# Patient Record
Sex: FEMALE | Race: WHITE | NOT HISPANIC OR LATINO | Employment: UNEMPLOYED | URBAN - NONMETROPOLITAN AREA
[De-identification: names, ages, dates, MRNs, and addresses within clinical notes are randomized per-mention and may not be internally consistent; named-entity substitution may affect disease eponyms.]

---

## 2019-02-19 ENCOUNTER — OFFICE VISIT (OUTPATIENT)
Dept: UROLOGY | Facility: CLINIC | Age: 54
End: 2019-02-19

## 2019-02-19 VITALS — BODY MASS INDEX: 41.91 KG/M2 | HEIGHT: 61 IN | WEIGHT: 222 LBS

## 2019-02-19 DIAGNOSIS — R32 URINARY INCONTINENCE, UNSPECIFIED TYPE: ICD-10-CM

## 2019-02-19 DIAGNOSIS — N39.41 URGENCY INCONTINENCE: ICD-10-CM

## 2019-02-19 DIAGNOSIS — N39.3 STRESS INCONTINENCE: Primary | ICD-10-CM

## 2019-02-19 LAB
BILIRUB BLD-MCNC: NEGATIVE MG/DL
CLARITY, POC: CLEAR
COLOR UR: YELLOW
GLUCOSE UR STRIP-MCNC: NEGATIVE MG/DL
KETONES UR QL: NEGATIVE
LEUKOCYTE EST, POC: NEGATIVE
NITRITE UR-MCNC: POSITIVE MG/ML
PH UR: 6 [PH] (ref 5–8)
PROT UR STRIP-MCNC: ABNORMAL MG/DL
RBC # UR STRIP: ABNORMAL /UL
SP GR UR: 1.02 (ref 1–1.03)
UROBILINOGEN UR QL: NORMAL

## 2019-02-19 PROCEDURE — 81003 URINALYSIS AUTO W/O SCOPE: CPT | Performed by: UROLOGY

## 2019-02-19 PROCEDURE — 99203 OFFICE O/P NEW LOW 30 MIN: CPT | Performed by: UROLOGY

## 2019-02-19 NOTE — PROGRESS NOTES
Chief Complaint:          Progressive incontinence    HPI:   53 y.o. female.  Pt has stress incontinence at times and urgency incontinence at times.  She does not have any children.  This problem started 2 years ago and has progressively worse.  She wears 2 pads a day.  HPI      Past Medical History:        Past Medical History:   Diagnosis Date   • Arthritis    • Cirrhosis of liver (CMS/HCC)    • Heart disease    • History of transfusion          Current Meds:     Current Outpatient Medications   Medication Sig Dispense Refill   • amLODIPine (NORVASC) 2.5 MG tablet Take  by mouth daily.     • citalopram (CELEXA) 20 MG tablet Take  by mouth daily.     • esomeprazole (NEXIUM) 40 MG capsule Take  by mouth.     • gabapentin (NEURONTIN) 800 MG tablet Take  by mouth 3 (three) times a day.     • rosuvastatin (CRESTOR) 10 MG tablet Take  by mouth daily.       No current facility-administered medications for this visit.         Allergies:      Allergies   Allergen Reactions   • Toprol Xl [Metoprolol]         Past Surgical History:     Past Surgical History:   Procedure Laterality Date   • KNEE SURGERY     • LAPAROSCOPIC CHOLECYSTECTOMY           Social History:     Social History     Socioeconomic History   • Marital status: Single     Spouse name: Not on file   • Number of children: Not on file   • Years of education: Not on file   • Highest education level: Not on file   Social Needs   • Financial resource strain: Not on file   • Food insecurity - worry: Not on file   • Food insecurity - inability: Not on file   • Transportation needs - medical: Not on file   • Transportation needs - non-medical: Not on file   Occupational History   • Not on file   Tobacco Use   • Smoking status: Current Every Day Smoker   • Smokeless tobacco: Never Used   Substance and Sexual Activity   • Alcohol use: No   • Drug use: No   • Sexual activity: Not on file   Other Topics Concern   • Not on file   Social History Narrative   • Not on file        Family History:     Family History   Problem Relation Age of Onset   • Heart disease Father    • Heart disease Sister    • Cirrhosis Mother        Review of Systems:     Review of Systems   Constitutional: Negative for chills, fatigue and fever.   HENT: Negative for sinus pressure and sinus pain.    Respiratory: Negative for cough.    Cardiovascular: Negative for chest pain and palpitations.   Gastrointestinal: Negative for abdominal pain, constipation and diarrhea.   Genitourinary: Positive for frequency and urgency. Negative for dysuria.   Musculoskeletal: Negative for back pain.   Neurological: Negative for headaches.   Psychiatric/Behavioral: The patient is not nervous/anxious.      0    Over the past month…    1)  Incomplete Emptying  How often have you had a sensation of not emptying your bladder?  1 - Less than 1 time in 5   2)  Frequency  How often have you had to urinate less than every two hours? 3 - About half the time   3)  Intermittency  How often have you found you stopped and started again several times when you urinated?  0 - Not at all   4) Urgency  How often have you found it difficult to postpone urination?  3 - About half the time   5) Weak Stream  How often have you had a weak urinary stream?  0 - Not at all   6) Straining  How often have you had to push or strain to begin urination?  0 - Not at all   7) Nocturia  How many times did you typically get up at night to urinate?  5 - 5+ times   Total Score:  12       Quality of life due to urinary symptoms:  If you were to spend the rest of your life with your urinary condition the way it is now, how would you feel about that? 4-Mostly Dissatisfied   Urine Leakage (Incontinence) 2-Mild (More than a few drops a day, 1-2 pads/day)       I have reviewed the follow portions of the patient's history and confirmed they are accurate today:  allergies, current medications, past family history, past medical history, past social history, past surgical  "history, problem list and ROS  Physical Exam:     Physical Exam   Constitutional: She is oriented to person, place, and time. She appears well-developed.   Morbidly obese   HENT:   Head: Normocephalic.   Right Ear: External ear normal.   Left Ear: External ear normal.   Nose: Nose normal.   Mouth/Throat: Oropharynx is clear and moist.   Eyes: Conjunctivae and EOM are normal. Pupils are equal, round, and reactive to light.   Neck: Normal range of motion. Neck supple. No tracheal deviation present. No thyromegaly present.   Cardiovascular: Normal heart sounds. Exam reveals no gallop and no friction rub.   No murmur heard.  Pulmonary/Chest: Effort normal and breath sounds normal. No respiratory distress. She has no wheezes. She has no rales. She exhibits no tenderness.   Abdominal: Soft. Bowel sounds are normal. She exhibits no distension and no mass. There is no tenderness. There is no rebound and no guarding. No hernia.   Genitourinary: Vagina normal and uterus normal.   Genitourinary Comments: The patient has hypermobility of her urethrovesical angle with stress incontinence when she does a Valsalva maneuver   Musculoskeletal: Normal range of motion. She exhibits no edema.   Lymphadenopathy:     She has no cervical adenopathy.   Neurological: She is alert and oriented to person, place, and time. She displays normal reflexes. No cranial nerve deficit. She exhibits normal muscle tone. Coordination normal.   Skin: Skin is warm. No rash noted.   Psychiatric: She has a normal mood and affect. Judgment normal.       Ht 154.9 cm (61\")   Wt 101 kg (222 lb)   BMI 41.95 kg/m²    Procedure:         Assessment:     Encounter Diagnoses   Name Primary?   • Urinary incontinence, unspecified type    • Urgency incontinence    • Stress incontinence Yes     Orders Placed This Encounter   Procedures   • POC Urinalysis Dipstick, Automated       Plan:   The patient definitely has some stress incontinence as demonstrated on her pelvic " exam.  He also has urgency incontinence by history.  We'll start treating her urgency incontinence with Myrebetriq to see if she responds and how much of a residual problem is from her stress incontinence.  The patient has not had any children but she does have a problem with morbid obesity.  To the patient back in a month.    Patient's Body mass index is 41.95 kg/m². BMI is above normal parameters. Recommendations include: educational material.      Counseling was given to patient for the following topics impressions as follows: stress incontinence and urgency incontinence. The interim medical history and current results were reviewed.  A treatment plan with follow-up was made for Stress incontinence [N39.3].  This document has been electronically signed by Bull Smith MD February 19, 2019 2:20 PM

## 2019-03-19 ENCOUNTER — OFFICE VISIT (OUTPATIENT)
Dept: UROLOGY | Facility: CLINIC | Age: 54
End: 2019-03-19

## 2019-03-19 VITALS — HEIGHT: 61 IN | WEIGHT: 222 LBS | BODY MASS INDEX: 41.91 KG/M2

## 2019-03-19 DIAGNOSIS — N39.0 URINARY TRACT INFECTION WITH HEMATURIA, SITE UNSPECIFIED: ICD-10-CM

## 2019-03-19 DIAGNOSIS — R35.0 FREQUENCY OF MICTURITION: Primary | ICD-10-CM

## 2019-03-19 DIAGNOSIS — R31.9 URINARY TRACT INFECTION WITH HEMATURIA, SITE UNSPECIFIED: ICD-10-CM

## 2019-03-19 LAB
BILIRUB BLD-MCNC: ABNORMAL MG/DL
CLARITY, POC: ABNORMAL
COLOR UR: YELLOW
GLUCOSE UR STRIP-MCNC: NEGATIVE MG/DL
KETONES UR QL: NEGATIVE
LEUKOCYTE EST, POC: ABNORMAL
NITRITE UR-MCNC: POSITIVE MG/ML
PH UR: 6 [PH] (ref 5–8)
PROT UR STRIP-MCNC: NEGATIVE MG/DL
RBC # UR STRIP: NEGATIVE /UL
SP GR UR: 1.02 (ref 1–1.03)
UROBILINOGEN UR QL: NORMAL

## 2019-03-19 PROCEDURE — 81003 URINALYSIS AUTO W/O SCOPE: CPT | Performed by: UROLOGY

## 2019-03-19 PROCEDURE — 87186 SC STD MICRODIL/AGAR DIL: CPT | Performed by: UROLOGY

## 2019-03-19 PROCEDURE — 87088 URINE BACTERIA CULTURE: CPT | Performed by: UROLOGY

## 2019-03-19 PROCEDURE — 87086 URINE CULTURE/COLONY COUNT: CPT | Performed by: UROLOGY

## 2019-03-19 PROCEDURE — 99214 OFFICE O/P EST MOD 30 MIN: CPT | Performed by: UROLOGY

## 2019-03-19 NOTE — PROGRESS NOTES
Chief Complaint:        stress and urgency incontinence    HPI:   53 y.o. female.  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  Her symptms have 80% improved with myrbetriq.  HPI      Past Medical History:        Past Medical History:   Diagnosis Date   • Arthritis    • Cirrhosis of liver (CMS/HCC)    • Heart disease    • History of transfusion          Current Meds:     Current Outpatient Medications   Medication Sig Dispense Refill   • amLODIPine (NORVASC) 2.5 MG tablet Take  by mouth daily.     • citalopram (CELEXA) 20 MG tablet Take  by mouth daily.     • esomeprazole (NEXIUM) 40 MG capsule Take  by mouth.     • gabapentin (NEURONTIN) 800 MG tablet Take  by mouth 3 (three) times a day.     • Mirabegron ER (MYRBETRIQ) 50 MG tablet sustained-release 24 hour 24 hr tablet Take 50 mg by mouth Daily. 30 tablet 11   • rosuvastatin (CRESTOR) 10 MG tablet Take  by mouth daily.       No current facility-administered medications for this visit.         Allergies:      Allergies   Allergen Reactions   • Toprol Xl [Metoprolol]         Past Surgical History:     Past Surgical History:   Procedure Laterality Date   • KNEE SURGERY     • LAPAROSCOPIC CHOLECYSTECTOMY           Social History:     Social History     Socioeconomic History   • Marital status: Single     Spouse name: Not on file   • Number of children: Not on file   • Years of education: Not on file   • Highest education level: Not on file   Social Needs   • Financial resource strain: Not on file   • Food insecurity - worry: Not on file   • Food insecurity - inability: Not on file   • Transportation needs - medical: Not on file   • Transportation needs - non-medical: Not on file   Occupational History   • Not on file   Tobacco Use   • Smoking status: Current Every Day Smoker   • Smokeless tobacco: Never Used   Substance and Sexual Activity   • Alcohol use: No   • Drug use: No   • Sexual activity: Not on file   Other  "Topics Concern   • Not on file   Social History Narrative   • Not on file       Family History:     Family History   Problem Relation Age of Onset   • Heart disease Father    • Heart disease Sister    • Cirrhosis Mother        Review of Systems:     Review of Systems   Constitutional: Negative for fatigue.   HENT: Negative for sinus pressure and sinus pain.    Eyes: Negative for pain.   Respiratory: Negative for chest tightness.    Cardiovascular: Negative for chest pain.   Endocrine: Negative for heat intolerance.   Genitourinary: Negative for flank pain.   Musculoskeletal: Negative for back pain.   Allergic/Immunologic: Negative for food allergies.   Neurological: Negative for headaches.   Hematological: Does not bruise/bleed easily.   Psychiatric/Behavioral: The patient is not nervous/anxious.              I have reviewed the follow portions of the patient's history and confirmed they are accurate today:  allergies, current medications, past family history, past medical history, past social history, past surgical history, problem list and ROS  Physical Exam:     Physical Exam    Ht 154.9 cm (61\")   Wt 101 kg (222 lb)   BMI 41.95 kg/m²    Procedure:         Assessment:     Encounter Diagnoses   Name Primary?   • Frequency of micturition Yes   • Urinary tract infection with hematuria, site unspecified      Orders Placed This Encounter   Procedures   • Urine Culture - Urine, Urine, Random Void   • POC Urinalysis Dipstick, Automated       Plan:   Will continue myrbetriq and will have her try to loose weight to help with the stress incontinence    Patient's Body mass index is 41.95 kg/m². BMI is above normal parameters. Recommendations include: educational material.      Counseling was given to patient for the following topics instructions for management as follows: urge and stress incontinence. The interim medical history and current results were reviewed.  A treatment plan with follow-up was made for Frequency of " micturition [R35.0].  This document has been electronically signed by Bull Smith MD March 19, 2019 3:56 PM

## 2019-03-21 ENCOUNTER — TELEPHONE (OUTPATIENT)
Dept: UROLOGY | Facility: CLINIC | Age: 54
End: 2019-03-21

## 2019-03-21 LAB — BACTERIA SPEC AEROBE CULT: ABNORMAL

## 2019-03-21 NOTE — TELEPHONE ENCOUNTER
Spoke to pt, explained results to her as well as the medication, transferred the pt to [facility] to make an appt for next month. Called pts local pharm.           ----- Message from Bull Smith MD sent at 3/21/2019  8:19 AM EDT -----  Call pt and tell her urine culture results with name of organism and call in rx for septra DS or generic bid dist 20 then have her return one month for appointment and cath urine for culture and UA  ----- Message -----  From: Staci Sánchez MA  Sent: 3/19/2019   3:48 PM  To: Bull Smith MD

## 2020-01-09 PROBLEM — J44.9 COPD (CHRONIC OBSTRUCTIVE PULMONARY DISEASE): Status: ACTIVE | Noted: 2020-01-09

## 2020-01-09 PROBLEM — R07.9 CHEST PAIN: Status: ACTIVE | Noted: 2020-01-09

## 2020-01-09 PROBLEM — I25.10 CORONARY ARTERY DISEASE INVOLVING NATIVE CORONARY ARTERY OF NATIVE HEART: Status: ACTIVE | Noted: 2020-01-09

## 2020-01-09 PROBLEM — I10 ESSENTIAL HYPERTENSION: Status: ACTIVE | Noted: 2020-01-09

## 2020-01-09 PROBLEM — R79.89 ELEVATED LFTS: Status: ACTIVE | Noted: 2020-01-09

## 2020-01-09 PROBLEM — Q21.12 PFO (PATENT FORAMEN OVALE): Status: ACTIVE | Noted: 2020-01-09

## 2020-01-09 PROBLEM — E78.5 HYPERLIPIDEMIA LDL GOAL <100: Status: ACTIVE | Noted: 2020-01-09

## 2020-01-10 ENCOUNTER — CONSULT (OUTPATIENT)
Dept: CARDIOLOGY | Facility: CLINIC | Age: 55
End: 2020-01-10

## 2020-01-10 VITALS
DIASTOLIC BLOOD PRESSURE: 78 MMHG | HEIGHT: 61 IN | OXYGEN SATURATION: 96 % | HEART RATE: 58 BPM | WEIGHT: 234 LBS | BODY MASS INDEX: 44.18 KG/M2 | SYSTOLIC BLOOD PRESSURE: 124 MMHG

## 2020-01-10 DIAGNOSIS — I20.9 ANGINA PECTORIS (HCC): Primary | ICD-10-CM

## 2020-01-10 DIAGNOSIS — E78.5 HYPERLIPIDEMIA LDL GOAL <100: ICD-10-CM

## 2020-01-10 DIAGNOSIS — I10 ESSENTIAL HYPERTENSION: ICD-10-CM

## 2020-01-10 DIAGNOSIS — Q21.12 PFO (PATENT FORAMEN OVALE): ICD-10-CM

## 2020-01-10 DIAGNOSIS — R94.39 ABNORMAL NUCLEAR STRESS TEST: ICD-10-CM

## 2020-01-10 PROBLEM — R07.9 CHEST PAIN: Status: RESOLVED | Noted: 2020-01-09 | Resolved: 2020-01-10

## 2020-01-10 PROBLEM — E66.01 MORBID OBESITY (HCC): Status: ACTIVE | Noted: 2020-01-10

## 2020-01-10 PROBLEM — R06.09 DYSPNEA ON EXERTION: Status: ACTIVE | Noted: 2020-01-10

## 2020-01-10 PROBLEM — I25.119 CORONARY ARTERY DISEASE INVOLVING NATIVE CORONARY ARTERY OF NATIVE HEART WITH ANGINA PECTORIS: Status: ACTIVE | Noted: 2020-01-09

## 2020-01-10 PROCEDURE — 93000 ELECTROCARDIOGRAM COMPLETE: CPT | Performed by: INTERNAL MEDICINE

## 2020-01-10 PROCEDURE — 99204 OFFICE O/P NEW MOD 45 MIN: CPT | Performed by: INTERNAL MEDICINE

## 2020-01-10 RX ORDER — ROSUVASTATIN CALCIUM 10 MG/1
10 TABLET, COATED ORAL DAILY
COMMUNITY
End: 2020-01-10 | Stop reason: SDUPTHER

## 2020-01-10 RX ORDER — ESOMEPRAZOLE MAGNESIUM 40 MG/1
40 CAPSULE, DELAYED RELEASE ORAL NIGHTLY
COMMUNITY

## 2020-01-10 RX ORDER — ROSUVASTATIN CALCIUM 10 MG/1
10 TABLET, COATED ORAL DAILY
Qty: 90 TABLET | Refills: 3
Start: 2020-01-10

## 2020-01-10 RX ORDER — PREDNISONE 50 MG/1
50 TABLET ORAL DAILY
Qty: 3 TABLET | Refills: 0 | Status: SHIPPED | OUTPATIENT
Start: 2020-01-10 | End: 2020-01-23 | Stop reason: HOSPADM

## 2020-01-10 RX ORDER — AMLODIPINE BESYLATE 5 MG/1
5 TABLET ORAL DAILY
Start: 2020-01-10

## 2020-01-10 NOTE — H&P (VIEW-ONLY)
"[facility]  Cardiology Consultation Note     Viola Madrid  1965  Requesting Provider: MILAD Dietz  PCP: Nimco Patino APRN    ID:  Viola Madrid is a 54 y.o. female who resides in [place], [place]    REASON FOR CONSULTATION:  · Chest pain  · Abnormal stress             Dear Nimco:    Thank you for referring Viola Madrid back to my office for reevaluation.  The patient is a 54-year-old female with a history of atypical chest pain symptoms and a history of normal coronary arteries on cardiac catheterization in 2011.  The patient has been experiencing left-sided chest pain which radiates to her left arm.  The symptoms occur at rest and when walking.  The symptoms last for about 45 minutes to an hour and resolve spontaneously.  The symptoms can be associated with shortness of breath, dizziness, and sometimes nausea.  She has not taken nitroglycerin for these symptoms.  She also complains of generalized fatigue and states that she sleeps \"a lot\".  She also reports dyspnea with mild activities.    The patient underwent a pharmacologic nuclear stress test in October to assess the symptoms.  This was performed at [facility] and nuclear images suggested anterior ischemia.  It should be noted that prior to cardiac catheterization revealing normal coronary arteries, she had similar findings on a nuclear stress test.    Cardiac risk factors include hypertension, hyperlipidemia    Past Medical History, Past Surgical History, Family history, Social History, and Medications were all reviewed with the patient today and updated as necessary.       Current Outpatient Medications:   •  amLODIPine (NORVASC) 5 MG tablet, Take 1 tablet by mouth Daily., Disp: , Rfl:   •  citalopram (CELEXA) 20 MG tablet, Take  by mouth daily., Disp: , Rfl:   •  esomeprazole (nexIUM) 40 MG capsule, Take 40 mg by mouth Every Morning Before Breakfast., Disp: , Rfl:   •  Mirabegron ER " "(MYRBETRIQ) 50 MG tablet sustained-release 24 hour 24 hr tablet, Take 50 mg by mouth Daily., Disp: , Rfl:   •  rosuvastatin (CRESTOR) 10 MG tablet, Take 1 tablet by mouth Daily., Disp: 90 tablet, Rfl: 3  •  aspirin 81 MG tablet, Take 1 tablet by mouth Daily., Disp: 90 tablet, Rfl: 3    Allergies   Allergen Reactions   • Toprol Xl [Metoprolol] Anaphylaxis     Stopped heart         Past Medical History:   Diagnosis Date   • Acid reflux    • Arthritis    • Cirrhosis of liver (CMS/HCC)    • Essential hypertension 1/9/2020   • History of stomach ulcers    • History of transfusion      Past Surgical History:   Procedure Laterality Date   • KNEE SURGERY     • LAPAROSCOPIC CHOLECYSTECTOMY       Family History   Problem Relation Age of Onset   • Heart disease Father    • COPD Father    • Heart attack Father    • Heart disease Sister    • Cardiomyopathy Sister    • Cirrhosis Mother    • Heart disease Brother    • Obesity Brother      Social History     Tobacco Use   • Smoking status: Former Smoker     Types: Cigarettes   • Smokeless tobacco: Never Used   • Tobacco comment: 7 years ago   Substance Use Topics   • Alcohol use: No       Review of Systems   Constitution: Positive for malaise/fatigue and weight gain.   Cardiovascular: Positive for chest pain and palpitations.   Respiratory: Positive for shortness of breath.    Musculoskeletal: Positive for arthritis and myalgias.   Gastrointestinal: Positive for heartburn, nausea and vomiting.   Neurological: Positive for dizziness.            /78 (BP Location: Right arm, Patient Position: Sitting)   Pulse 58   Ht 154.9 cm (61\")   Wt 106 kg (234 lb)   SpO2 96%   BMI 44.21 kg/m²        Physical Exam   Constitutional: She is oriented to person, place, and time. She appears well-developed.   Morbidly obese   HENT:   Head: Normocephalic and atraumatic.   Eyes: Conjunctivae are normal. No scleral icterus.   Neck: Normal range of motion. No JVD present. Carotid bruit is not " present. No thyromegaly present.   Cardiovascular: Normal rate and regular rhythm. Exam reveals no gallop.   No murmur heard.  Pulmonary/Chest: Effort normal and breath sounds normal.   Abdominal: Soft. She exhibits no distension and no mass. There is no hepatosplenomegaly.   Musculoskeletal: She exhibits no edema.   Neurological: She is alert and oriented to person, place, and time.   Skin: Skin is warm and dry. No rash noted.   Psychiatric: She has a normal mood and affect. Her behavior is normal.           ECG 12 Lead  Date/Time: 1/10/2020 1:20 PM  Performed by: Topher Reich IV, MD  Authorized by: Topher Reich IV, MD   Comparison: not compared with previous ECG   Previous ECG: no previous ECG available  Rhythm: sinus bradycardia  BPM: 54  Other findings: non-specific ST-T wave changes    Clinical impression: non-specific ECG          Labs (01/03/20):  · TSH 4.44  · Chol 157, Trig 130, HDL 48, LDL 83  · Creat 0.82, BUN 13, K 4.3, Na 136, AST 30, ALT 20  · WBC 9.6, RBC 4.85, HGB 34.6, HCT 45.5,        Problem List Items Addressed This Visit        Cardiology Problems    Angina pectoris (CMS/HCC) - Primary    Overview     · Nuclear stress test (2011):  Anterior wall ischemia with abnormal wall motion. EF 59%.  · Cardiac catherization at [facility] (2011):  Normal coronary arteries.   · Echocardiogram (01/16/2015): LVEF 50-60%. Mild MR and TR.   · Pharmacologic nuclear stress @ [facility] (10/2/2019): Moderate anterior ischemia.  LVEF 64%  · Echo @ [facility] (10/2/2018): LVEF 70%.  No significant valve abnormality.         Relevant Medications    amLODIPine (NORVASC) 5 MG tablet    aspirin 81 MG tablet    Other Relevant Orders    Case Request Cath Lab: Left Heart Cath    Abnormal nuclear stress test    Overview     · Pharmacologic nuclear stress @ [facility] (10/2/2019): Moderate anterior ischemia.  LVEF 64%         Relevant Orders    Case Request Cath Lab: Left Heart Cath    Essential  hypertension    Relevant Medications    amLODIPine (NORVASC) 5 MG tablet    Hyperlipidemia LDL goal <100    Relevant Medications    rosuvastatin (CRESTOR) 10 MG tablet    PFO (patent foramen ovale)    Overview     · Echocardiogram (2009): Normal LVEF.  PFO with left to right shunt. RVSP 42 mmHg.         Current Assessment & Plan     · No TIA or stroke symptoms that would indicate PFO closure         Relevant Medications    amLODIPine (NORVASC) 5 MG tablet                 · Cardiac catheterization via the right radial approach  · Further recommendations to follow    PENNY Reich MD [facility], [facility]  Interventional and General Cardiology

## 2020-01-10 NOTE — ASSESSMENT & PLAN NOTE
· Chest pain symptoms have mostly atypical but some typical qualities  · I suspect her pain is noncardiac  · While I suspect recent stress test abnormality is related to breast attenuation, definitive assessment of LAD territory recommended given moderate sized anterior ischemia  · Cardiac catheterization recommended  · Continue low-dose aspirin  · No beta-blocker due to bradycardia

## 2020-01-10 NOTE — PROGRESS NOTES
"[facility]  Cardiology Consultation Note     Viola Madrid  1965  Requesting Provider: MILAD Dietz  PCP: Nimco Patino APRN    ID:  Viola Madrid is a 54 y.o. female who resides in [place], [place]    REASON FOR CONSULTATION:  · Chest pain  · Abnormal stress             Dear Nimco:    Thank you for referring Viola Madrid back to my office for reevaluation.  The patient is a 54-year-old female with a history of atypical chest pain symptoms and a history of normal coronary arteries on cardiac catheterization in 2011.  The patient has been experiencing left-sided chest pain which radiates to her left arm.  The symptoms occur at rest and when walking.  The symptoms last for about 45 minutes to an hour and resolve spontaneously.  The symptoms can be associated with shortness of breath, dizziness, and sometimes nausea.  She has not taken nitroglycerin for these symptoms.  She also complains of generalized fatigue and states that she sleeps \"a lot\".  She also reports dyspnea with mild activities.    The patient underwent a pharmacologic nuclear stress test in October to assess the symptoms.  This was performed at [facility] and nuclear images suggested anterior ischemia.  It should be noted that prior to cardiac catheterization revealing normal coronary arteries, she had similar findings on a nuclear stress test.    Cardiac risk factors include hypertension, hyperlipidemia    Past Medical History, Past Surgical History, Family history, Social History, and Medications were all reviewed with the patient today and updated as necessary.       Current Outpatient Medications:   •  amLODIPine (NORVASC) 5 MG tablet, Take 1 tablet by mouth Daily., Disp: , Rfl:   •  citalopram (CELEXA) 20 MG tablet, Take  by mouth daily., Disp: , Rfl:   •  esomeprazole (nexIUM) 40 MG capsule, Take 40 mg by mouth Every Morning Before Breakfast., Disp: , Rfl:   •  Mirabegron ER " "(MYRBETRIQ) 50 MG tablet sustained-release 24 hour 24 hr tablet, Take 50 mg by mouth Daily., Disp: , Rfl:   •  rosuvastatin (CRESTOR) 10 MG tablet, Take 1 tablet by mouth Daily., Disp: 90 tablet, Rfl: 3  •  aspirin 81 MG tablet, Take 1 tablet by mouth Daily., Disp: 90 tablet, Rfl: 3    Allergies   Allergen Reactions   • Toprol Xl [Metoprolol] Anaphylaxis     Stopped heart         Past Medical History:   Diagnosis Date   • Acid reflux    • Arthritis    • Cirrhosis of liver (CMS/HCC)    • Essential hypertension 1/9/2020   • History of stomach ulcers    • History of transfusion      Past Surgical History:   Procedure Laterality Date   • KNEE SURGERY     • LAPAROSCOPIC CHOLECYSTECTOMY       Family History   Problem Relation Age of Onset   • Heart disease Father    • COPD Father    • Heart attack Father    • Heart disease Sister    • Cardiomyopathy Sister    • Cirrhosis Mother    • Heart disease Brother    • Obesity Brother      Social History     Tobacco Use   • Smoking status: Former Smoker     Types: Cigarettes   • Smokeless tobacco: Never Used   • Tobacco comment: 7 years ago   Substance Use Topics   • Alcohol use: No       Review of Systems   Constitution: Positive for malaise/fatigue and weight gain.   Cardiovascular: Positive for chest pain and palpitations.   Respiratory: Positive for shortness of breath.    Musculoskeletal: Positive for arthritis and myalgias.   Gastrointestinal: Positive for heartburn, nausea and vomiting.   Neurological: Positive for dizziness.            /78 (BP Location: Right arm, Patient Position: Sitting)   Pulse 58   Ht 154.9 cm (61\")   Wt 106 kg (234 lb)   SpO2 96%   BMI 44.21 kg/m²        Physical Exam   Constitutional: She is oriented to person, place, and time. She appears well-developed.   Morbidly obese   HENT:   Head: Normocephalic and atraumatic.   Eyes: Conjunctivae are normal. No scleral icterus.   Neck: Normal range of motion. No JVD present. Carotid bruit is not " present. No thyromegaly present.   Cardiovascular: Normal rate and regular rhythm. Exam reveals no gallop.   No murmur heard.  Pulmonary/Chest: Effort normal and breath sounds normal.   Abdominal: Soft. She exhibits no distension and no mass. There is no hepatosplenomegaly.   Musculoskeletal: She exhibits no edema.   Neurological: She is alert and oriented to person, place, and time.   Skin: Skin is warm and dry. No rash noted.   Psychiatric: She has a normal mood and affect. Her behavior is normal.           ECG 12 Lead  Date/Time: 1/10/2020 1:20 PM  Performed by: Topher Reich IV, MD  Authorized by: Topher Reich IV, MD   Comparison: not compared with previous ECG   Previous ECG: no previous ECG available  Rhythm: sinus bradycardia  BPM: 54  Other findings: non-specific ST-T wave changes    Clinical impression: non-specific ECG          Labs (01/03/20):  · TSH 4.44  · Chol 157, Trig 130, HDL 48, LDL 83  · Creat 0.82, BUN 13, K 4.3, Na 136, AST 30, ALT 20  · WBC 9.6, RBC 4.85, HGB 34.6, HCT 45.5,        Problem List Items Addressed This Visit        Cardiology Problems    Angina pectoris (CMS/HCC) - Primary    Overview     · Nuclear stress test (2011):  Anterior wall ischemia with abnormal wall motion. EF 59%.  · Cardiac catherization at [facility] (2011):  Normal coronary arteries.   · Echocardiogram (01/16/2015): LVEF 50-60%. Mild MR and TR.   · Pharmacologic nuclear stress @ [facility] (10/2/2019): Moderate anterior ischemia.  LVEF 64%  · Echo @ [facility] (10/2/2018): LVEF 70%.  No significant valve abnormality.         Relevant Medications    amLODIPine (NORVASC) 5 MG tablet    aspirin 81 MG tablet    Other Relevant Orders    Case Request Cath Lab: Left Heart Cath    Abnormal nuclear stress test    Overview     · Pharmacologic nuclear stress @ [facility] (10/2/2019): Moderate anterior ischemia.  LVEF 64%         Relevant Orders    Case Request Cath Lab: Left Heart Cath    Essential  hypertension    Relevant Medications    amLODIPine (NORVASC) 5 MG tablet    Hyperlipidemia LDL goal <100    Relevant Medications    rosuvastatin (CRESTOR) 10 MG tablet    PFO (patent foramen ovale)    Overview     · Echocardiogram (2009): Normal LVEF.  PFO with left to right shunt. RVSP 42 mmHg.         Current Assessment & Plan     · No TIA or stroke symptoms that would indicate PFO closure         Relevant Medications    amLODIPine (NORVASC) 5 MG tablet                 · Cardiac catheterization via the right radial approach  · Further recommendations to follow    PENNY Reich MD [facility], [facility]  Interventional and General Cardiology

## 2020-01-14 ENCOUNTER — PREP FOR SURGERY (OUTPATIENT)
Dept: OTHER | Facility: HOSPITAL | Age: 55
End: 2020-01-14

## 2020-01-14 DIAGNOSIS — R94.39 ABNORMAL NUCLEAR STRESS TEST: Primary | ICD-10-CM

## 2020-01-14 RX ORDER — NITROGLYCERIN 0.4 MG/1
0.4 TABLET SUBLINGUAL
Status: CANCELLED | OUTPATIENT
Start: 2020-01-14

## 2020-01-14 RX ORDER — ASPIRIN 81 MG/1
81 TABLET ORAL DAILY
Status: CANCELLED | OUTPATIENT
Start: 2020-01-15

## 2020-01-14 RX ORDER — ACETAMINOPHEN 325 MG/1
650 TABLET ORAL EVERY 4 HOURS PRN
Status: CANCELLED | OUTPATIENT
Start: 2020-01-14

## 2020-01-14 RX ORDER — SODIUM CHLORIDE 0.9 % (FLUSH) 0.9 %
3 SYRINGE (ML) INJECTION EVERY 12 HOURS SCHEDULED
Status: CANCELLED | OUTPATIENT
Start: 2020-01-14

## 2020-01-14 RX ORDER — SODIUM CHLORIDE 9 MG/ML
3 INJECTION, SOLUTION INTRAVENOUS CONTINUOUS
Status: CANCELLED | OUTPATIENT
Start: 2020-01-14 | End: 2020-01-14

## 2020-01-14 RX ORDER — SODIUM CHLORIDE 0.9 % (FLUSH) 0.9 %
10 SYRINGE (ML) INJECTION AS NEEDED
Status: CANCELLED | OUTPATIENT
Start: 2020-01-14

## 2020-01-14 RX ORDER — ASPIRIN 81 MG/1
324 TABLET, CHEWABLE ORAL ONCE
Status: CANCELLED | OUTPATIENT
Start: 2020-01-14 | End: 2020-01-14

## 2020-01-23 ENCOUNTER — HOSPITAL ENCOUNTER (OUTPATIENT)
Facility: HOSPITAL | Age: 55
Discharge: HOME | End: 2020-01-23
Attending: INTERNAL MEDICINE | Admitting: INTERNAL MEDICINE

## 2020-01-23 VITALS
HEIGHT: 61 IN | TEMPERATURE: 97.9 F | RESPIRATION RATE: 16 BRPM | WEIGHT: 229.28 LBS | SYSTOLIC BLOOD PRESSURE: 134 MMHG | HEART RATE: 65 BPM | BODY MASS INDEX: 43.29 KG/M2 | OXYGEN SATURATION: 92 % | DIASTOLIC BLOOD PRESSURE: 79 MMHG

## 2020-01-23 DIAGNOSIS — R94.39 ABNORMAL NUCLEAR STRESS TEST: ICD-10-CM

## 2020-01-23 DIAGNOSIS — I20.9 ANGINA PECTORIS (HCC): ICD-10-CM

## 2020-01-23 PROBLEM — R07.9 CHEST PAIN WITH NORMAL CORONARY ANGIOGRAPHY: Status: ACTIVE | Noted: 2020-01-09

## 2020-01-23 LAB
ALBUMIN SERPL-MCNC: 4.1 G/DL (ref 3.5–5.2)
ALBUMIN/GLOB SERPL: 1.3 G/DL
ALP SERPL-CCNC: 79 U/L (ref 39–117)
ALT SERPL W P-5'-P-CCNC: 15 U/L (ref 1–33)
ANION GAP SERPL CALCULATED.3IONS-SCNC: 14 MMOL/L (ref 5–15)
AST SERPL-CCNC: 18 U/L (ref 1–32)
B-HCG UR QL: NEGATIVE
BASOPHILS # BLD AUTO: 0.09 10*3/MM3 (ref 0–0.2)
BASOPHILS NFR BLD AUTO: 0.7 % (ref 0–1.5)
BILIRUB SERPL-MCNC: 1.3 MG/DL (ref 0.2–1.2)
BUN BLD-MCNC: 13 MG/DL (ref 6–20)
BUN/CREAT SERPL: 17.1 (ref 7–25)
CALCIUM SPEC-SCNC: 9.4 MG/DL (ref 8.6–10.5)
CHLORIDE SERPL-SCNC: 103 MMOL/L (ref 98–107)
CHOLEST SERPL-MCNC: 149 MG/DL (ref 0–200)
CO2 SERPL-SCNC: 22 MMOL/L (ref 22–29)
CREAT BLD-MCNC: 0.76 MG/DL (ref 0.57–1)
DEPRECATED RDW RBC AUTO: 45.8 FL (ref 37–54)
EOSINOPHIL # BLD AUTO: 0.3 10*3/MM3 (ref 0–0.4)
EOSINOPHIL NFR BLD AUTO: 2.2 % (ref 0.3–6.2)
ERYTHROCYTE [DISTWIDTH] IN BLOOD BY AUTOMATED COUNT: 12.9 % (ref 12.3–15.4)
GFR SERPL CREATININE-BSD FRML MDRD: 80 ML/MIN/1.73
GLOBULIN UR ELPH-MCNC: 3.2 GM/DL
GLUCOSE BLD-MCNC: 116 MG/DL (ref 65–99)
HBA1C MFR BLD: 5.6 % (ref 4.8–5.6)
HCT VFR BLD AUTO: 41.4 % (ref 34–46.6)
HDLC SERPL-MCNC: 46 MG/DL (ref 40–60)
HGB BLD-MCNC: 13.7 G/DL (ref 12–15.9)
IMM GRANULOCYTES # BLD AUTO: 0.05 10*3/MM3 (ref 0–0.05)
IMM GRANULOCYTES NFR BLD AUTO: 0.4 % (ref 0–0.5)
LDLC SERPL CALC-MCNC: 81 MG/DL (ref 0–100)
LDLC/HDLC SERPL: 1.77 {RATIO}
LYMPHOCYTES # BLD AUTO: 3.49 10*3/MM3 (ref 0.7–3.1)
LYMPHOCYTES NFR BLD AUTO: 26 % (ref 19.6–45.3)
MCH RBC QN AUTO: 31.7 PG (ref 26.6–33)
MCHC RBC AUTO-ENTMCNC: 33.1 G/DL (ref 31.5–35.7)
MCV RBC AUTO: 95.8 FL (ref 79–97)
MONOCYTES # BLD AUTO: 0.74 10*3/MM3 (ref 0.1–0.9)
MONOCYTES NFR BLD AUTO: 5.5 % (ref 5–12)
NEUTROPHILS # BLD AUTO: 8.77 10*3/MM3 (ref 1.7–7)
NEUTROPHILS NFR BLD AUTO: 65.2 % (ref 42.7–76)
NRBC BLD AUTO-RTO: 0 /100 WBC (ref 0–0.2)
PLATELET # BLD AUTO: 174 10*3/MM3 (ref 140–450)
PMV BLD AUTO: 10.9 FL (ref 6–12)
POTASSIUM BLD-SCNC: 4 MMOL/L (ref 3.5–5.2)
PROT SERPL-MCNC: 7.3 G/DL (ref 6–8.5)
RBC # BLD AUTO: 4.32 10*6/MM3 (ref 3.77–5.28)
SODIUM BLD-SCNC: 139 MMOL/L (ref 136–145)
TRIGL SERPL-MCNC: 108 MG/DL (ref 0–150)
VLDLC SERPL-MCNC: 21.6 MG/DL
WBC NRBC COR # BLD: 13.44 10*3/MM3 (ref 3.4–10.8)

## 2020-01-23 PROCEDURE — 25010000002 MIDAZOLAM PER 1 MG: Performed by: INTERNAL MEDICINE

## 2020-01-23 PROCEDURE — 25010000002 HEPARIN (PORCINE) PER 1000 UNITS: Performed by: INTERNAL MEDICINE

## 2020-01-23 PROCEDURE — 99152 MOD SED SAME PHYS/QHP 5/>YRS: CPT | Performed by: INTERNAL MEDICINE

## 2020-01-23 PROCEDURE — 0 IOPAMIDOL PER 1 ML: Performed by: INTERNAL MEDICINE

## 2020-01-23 PROCEDURE — 85025 COMPLETE CBC W/AUTO DIFF WBC: CPT | Performed by: NURSE PRACTITIONER

## 2020-01-23 PROCEDURE — 80053 COMPREHEN METABOLIC PANEL: CPT | Performed by: NURSE PRACTITIONER

## 2020-01-23 PROCEDURE — 25010000002 FENTANYL CITRATE (PF) 100 MCG/2ML SOLUTION: Performed by: INTERNAL MEDICINE

## 2020-01-23 PROCEDURE — 83036 HEMOGLOBIN GLYCOSYLATED A1C: CPT | Performed by: NURSE PRACTITIONER

## 2020-01-23 PROCEDURE — 81025 URINE PREGNANCY TEST: CPT | Performed by: INTERNAL MEDICINE

## 2020-01-23 PROCEDURE — 93458 L HRT ARTERY/VENTRICLE ANGIO: CPT | Performed by: INTERNAL MEDICINE

## 2020-01-23 PROCEDURE — 25010000002 ONDANSETRON PER 1 MG: Performed by: INTERNAL MEDICINE

## 2020-01-23 PROCEDURE — 93005 ELECTROCARDIOGRAM TRACING: CPT | Performed by: NURSE PRACTITIONER

## 2020-01-23 PROCEDURE — 36415 COLL VENOUS BLD VENIPUNCTURE: CPT

## 2020-01-23 PROCEDURE — 80061 LIPID PANEL: CPT | Performed by: NURSE PRACTITIONER

## 2020-01-23 PROCEDURE — C1769 GUIDE WIRE: HCPCS | Performed by: INTERNAL MEDICINE

## 2020-01-23 PROCEDURE — C1894 INTRO/SHEATH, NON-LASER: HCPCS | Performed by: INTERNAL MEDICINE

## 2020-01-23 RX ORDER — ASPIRIN 81 MG/1
324 TABLET, CHEWABLE ORAL ONCE
Status: COMPLETED | OUTPATIENT
Start: 2020-01-23 | End: 2020-01-23

## 2020-01-23 RX ORDER — ACETAMINOPHEN 325 MG/1
650 TABLET ORAL EVERY 4 HOURS PRN
Status: DISCONTINUED | OUTPATIENT
Start: 2020-01-23 | End: 2020-01-23 | Stop reason: HOSPADM

## 2020-01-23 RX ORDER — SODIUM CHLORIDE 0.9 % (FLUSH) 0.9 %
10 SYRINGE (ML) INJECTION AS NEEDED
Status: DISCONTINUED | OUTPATIENT
Start: 2020-01-23 | End: 2020-01-23 | Stop reason: HOSPADM

## 2020-01-23 RX ORDER — NITROGLYCERIN 0.4 MG/1
0.4 TABLET SUBLINGUAL
COMMUNITY
End: 2020-01-23 | Stop reason: HOSPADM

## 2020-01-23 RX ORDER — SODIUM CHLORIDE 0.9 % (FLUSH) 0.9 %
3 SYRINGE (ML) INJECTION EVERY 12 HOURS SCHEDULED
Status: DISCONTINUED | OUTPATIENT
Start: 2020-01-23 | End: 2020-01-23 | Stop reason: HOSPADM

## 2020-01-23 RX ORDER — SODIUM CHLORIDE 9 MG/ML
3 INJECTION, SOLUTION INTRAVENOUS CONTINUOUS
Status: ACTIVE | OUTPATIENT
Start: 2020-01-23 | End: 2020-01-23

## 2020-01-23 RX ORDER — FENTANYL CITRATE 50 UG/ML
INJECTION, SOLUTION INTRAMUSCULAR; INTRAVENOUS AS NEEDED
Status: DISCONTINUED | OUTPATIENT
Start: 2020-01-23 | End: 2020-01-23 | Stop reason: HOSPADM

## 2020-01-23 RX ORDER — LIDOCAINE HYDROCHLORIDE 10 MG/ML
INJECTION, SOLUTION EPIDURAL; INFILTRATION; INTRACAUDAL; PERINEURAL AS NEEDED
Status: DISCONTINUED | OUTPATIENT
Start: 2020-01-23 | End: 2020-01-23 | Stop reason: HOSPADM

## 2020-01-23 RX ORDER — ONDANSETRON 2 MG/ML
4 INJECTION INTRAMUSCULAR; INTRAVENOUS EVERY 6 HOURS PRN
Status: DISCONTINUED | OUTPATIENT
Start: 2020-01-23 | End: 2020-01-23 | Stop reason: HOSPADM

## 2020-01-23 RX ORDER — NITROGLYCERIN 0.4 MG/1
0.4 TABLET SUBLINGUAL
Status: DISCONTINUED | OUTPATIENT
Start: 2020-01-23 | End: 2020-01-23 | Stop reason: HOSPADM

## 2020-01-23 RX ORDER — MIDAZOLAM HYDROCHLORIDE 1 MG/ML
INJECTION INTRAMUSCULAR; INTRAVENOUS AS NEEDED
Status: DISCONTINUED | OUTPATIENT
Start: 2020-01-23 | End: 2020-01-23 | Stop reason: HOSPADM

## 2020-01-23 RX ORDER — ASPIRIN 81 MG/1
81 TABLET ORAL DAILY
Status: DISCONTINUED | OUTPATIENT
Start: 2020-01-24 | End: 2020-01-23 | Stop reason: HOSPADM

## 2020-01-23 RX ORDER — DIPHENHYDRAMINE HCL 25 MG
25 CAPSULE ORAL EVERY 6 HOURS PRN
COMMUNITY
End: 2020-01-23 | Stop reason: HOSPADM

## 2020-01-23 RX ADMIN — ONDANSETRON 4 MG: 2 INJECTION INTRAMUSCULAR; INTRAVENOUS at 08:12

## 2020-01-23 RX ADMIN — ASPIRIN 81 MG 324 MG: 81 TABLET ORAL at 08:11

## 2020-01-23 RX ADMIN — SODIUM CHLORIDE 3 ML/KG/HR: 9 INJECTION, SOLUTION INTRAVENOUS at 07:03

## 2020-01-23 NOTE — INTERVAL H&P NOTE
H&P reviewed. The patient was examined and there are no changes to the H&P.       No changes to the physical exam  Lab Results   Component Value Date    WBC 13.44 (H) 01/23/2020    HGB 13.7 01/23/2020    HCT 41.4 01/23/2020    MCV 95.8 01/23/2020     01/23/2020     Lab Results   Component Value Date    GLUCOSE 116 (H) 01/23/2020    BUN 13 01/23/2020    CREATININE 0.76 01/23/2020    EGFRIFNONA 80 01/23/2020    BCR 17.1 01/23/2020    K 4.0 01/23/2020    CO2 22.0 01/23/2020    CALCIUM 9.4 01/23/2020    ALBUMIN 4.10 01/23/2020    AST 18 01/23/2020    ALT 15 01/23/2020       Active Hospital Problems    Diagnosis   • **Angina pectoris (CMS/[facility])     · Nuclear stress test (2011):  Anterior wall ischemia with abnormal wall motion. EF 59%.  · Cardiac catherization at [facility] (2011):  Normal coronary arteries.   · Echocardiogram (01/16/2015): LVEF 50-60%. Mild MR and TR.   · Pharmacologic nuclear stress @ [facility] (10/2/2019): Moderate anterior ischemia.  LVEF 64%  · Echo @ [facility] (10/2/2018): LVEF 70%.  No significant valve abnormality.     • Abnormal nuclear stress test     · Pharmacologic nuclear stress @ [facility] (10/2/2019): Moderate anterior ischemia.  LVEF 64%     • Hyperlipidemia LDL goal <100   • Essential hypertension     Patient presents today to undergo cardiac catheterization for an abnormal stress test that suggests moderate anterior ischemia.  The risk and benefits of the procedure were discussed and the patient is agreeable to proceed.  Contrast is listed as an allergy.  The patient was ordered Benadryl and prednisone as part of premedication.  She took the Benadryl this morning but did not take the prednisone due to her history of JOHNSON.  She has an upset stomach and is requesting Zofran.  I question the patient's allergy to contrast that she has had 2 previous cardiac caths in the past and she did not have difficulty breathing or break out in a rash.  More than anything she reports GI  upset.      Plan:    · Cardiac cath via the right radial approach  · Give Zofran IV x1  · Give 325 mg aspirin  · Questionable contrast allergy.  Patient has received Benadryl but refuses prednisone.  · Further recommendations to follow      MILAD Ceron

## 2020-03-30 DIAGNOSIS — R35.0 FREQUENCY OF MICTURITION: Primary | ICD-10-CM

## 2020-03-30 RX ORDER — MIRABEGRON 50 MG/1
TABLET, FILM COATED, EXTENDED RELEASE ORAL
Qty: 30 TABLET | Refills: 11 | Status: SHIPPED | OUTPATIENT
Start: 2020-03-30 | End: 2021-03-16

## 2021-03-16 DIAGNOSIS — R35.0 FREQUENCY OF MICTURITION: ICD-10-CM

## 2021-03-16 RX ORDER — MIRABEGRON 50 MG/1
TABLET, FILM COATED, EXTENDED RELEASE ORAL
Qty: 30 TABLET | Refills: 11 | Status: SHIPPED | OUTPATIENT
Start: 2021-03-16 | End: 2022-03-01

## 2022-03-01 DIAGNOSIS — R35.0 FREQUENCY OF MICTURITION: ICD-10-CM

## 2022-03-01 RX ORDER — MIRABEGRON 50 MG/1
TABLET, FILM COATED, EXTENDED RELEASE ORAL
Qty: 30 TABLET | Refills: 11 | Status: SHIPPED | OUTPATIENT
Start: 2022-03-01 | End: 2023-03-31

## 2023-03-31 DIAGNOSIS — R35.0 FREQUENCY OF MICTURITION: ICD-10-CM

## 2023-03-31 RX ORDER — MIRABEGRON 50 MG/1
TABLET, FILM COATED, EXTENDED RELEASE ORAL
Qty: 30 TABLET | Refills: 11 | Status: SHIPPED | OUTPATIENT
Start: 2023-03-31

## (undated) DEVICE — Device

## (undated) DEVICE — PK CATH CARD 10

## (undated) DEVICE — GLIDESHEATH BASIC HYDROPHILIC COATED INTRODUCER SHEATH: Brand: GLIDESHEATH

## (undated) DEVICE — MODEL AT P65, P/N 701554-001KIT CONTENTS: HAND CONTROLLER, 3-WAY HIGH-PRESSURE STOPCOCK WITH ROTATING END AND PREMIUM HIGH-PRESSURE TUBING: Brand: ANGIOTOUCH® KIT

## (undated) DEVICE — MODEL BT2000 P/N 700287-012KIT CONTENTS: MANIFOLD WITH SALINE AND CONTRAST PORTS, SALINE TUBING WITH SPIKE AND HAND SYRINGE, TRANSDUCER: Brand: BT2000 AUTOMATED MANIFOLD KIT

## (undated) DEVICE — CATH DIAG EXPO M/ PK 5F FL4/FR4 PIG

## (undated) DEVICE — DEV COMP RAD PRELUDESYNC 24CM

## (undated) DEVICE — CATH DIAG EXPO .045 FL3  5F 100CM